# Patient Record
Sex: FEMALE | Race: BLACK OR AFRICAN AMERICAN | NOT HISPANIC OR LATINO | Employment: FULL TIME | URBAN - METROPOLITAN AREA
[De-identification: names, ages, dates, MRNs, and addresses within clinical notes are randomized per-mention and may not be internally consistent; named-entity substitution may affect disease eponyms.]

---

## 2023-10-15 RX ORDER — METHYLPREDNISOLONE 4 MG/1
TABLET ORAL
COMMUNITY

## 2023-10-15 RX ORDER — CYCLOBENZAPRINE HCL 10 MG
10 TABLET ORAL 3 TIMES DAILY PRN
COMMUNITY
Start: 2023-02-24

## 2023-10-17 ENCOUNTER — EVALUATION (OUTPATIENT)
Dept: PHYSICAL THERAPY | Facility: CLINIC | Age: 23
End: 2023-10-17
Payer: MEDICAID

## 2023-10-17 DIAGNOSIS — M54.9 CHRONIC BACK PAIN: Primary | ICD-10-CM

## 2023-10-17 DIAGNOSIS — G89.29 CHRONIC BACK PAIN: Primary | ICD-10-CM

## 2023-10-17 NOTE — PROGRESS NOTES
Physical Therapy Evaluation and Treatment      Patient Name: Suleman Gómez  MRN: [id number]  Today's Date: 10/17/2023       Visit Number: 1  Approved Visits: 30 then auth  Auth required: no  Assessment:  Rehab Prognosis: Good  Evaluation/Treatment Tolerance: Patient tolerated treatment well  Patient presents with chief complaint of back pain that has been present since    .Patient notes that    tends to aggravate his/her sxs. Patient demonstrates decreased lumbar ROM, decreased lumbopelvic control, impaired posture, decreased hip strength, decreased functional strength, and increased back pain, which limits his/her functional ability. Patient would likely benefit from skilled outpatient PT in order to address the above deficits and return to PLOF.   Plan:  Treatment/Interventions: Cryotherapy, Education/ Instruction, Electrical stimulation, Hot pack, Manual therapy, Neuromuscular re-education, Self care/ home management, Taping techniques, Therapeutic activities, Therapeutic exercises  PT Plan: Skilled PT  PT Frequency: 1 time per week  Duration: 6-8 weeks  Onset Date: 23  Number of Treatments Authorized: 30 then auth  Rehab Potential: Good  Plan of Care Agreement: Patient    Current Problem:   1. Chronic back pain            Subjective    General:  General  Reason for Referral: back pain  Referred By: EVELIA Montano  Chief complaint: chronic low back pain. Imaging reveals mild degenerative changes L5-S1, spina bifida S1, large laminar window L5-S1  PMHx:  : verified with patient  Social/Environmental Factors:  PLOF: independent without restrictions  Medications: see chart for full medication list  Patient goals for PT:  Medical Screening: Patient denies bowel/bladder changes, pulsatile mass in abdomen, recent infection/illness, drastic weight change, hx cancer, saddle anesthesia,    Precautions:  Precautions  Precautions Comment: none  Vital Signs:     Pain:     Home Living:     Prior Level of  Function:       Objective   Functional Performance:   DL Squat:   SL Squat:   SLS:   5x sit to stand test:   Step up/down 6in step:   Stairs negotiation: ascending: descending:  Gait Analysis:  Lumbar ROM:    Flexion   Extension:   Rotation:   SB:  Hip ROM:   Flexion: R: L:   Extension: R: L:   IR(0*): R: L:   ER(0*): R: L:   IR(90*): R: L:   ER(90*): R: L:  Neuro Exam:   Dermatome exam:   Myotome exam:   Reflexes:  MMT:   Hip abduction: R:/5 L:/5   Prone hip extension: R:/5 L:/5   Knee extension: R:/5 L:/5   Knee flexion: R:/5 L:/5  Special Tests:   Slump Test:   SLR:    L:    R:   Femoral Nerve Test:   Prone Instability:   Passive extension:  Observation:  Palpation:  Joint Play Assessment:  Repeated Movements:  STEPHANIE:     Outcome Measures:  {PT Outcome Measures:87755}     Treatments:      OP EDUCATION:  Education  Individual(s) Educated: Patient  Education Provided: Home Exercise Program  Risk and Benefits Discussed with Patient/Caregiver/Other: yes  Patient/Caregiver Demonstrated Understanding: yes  Plan of Care Discussed and Agreed Upon: yes  Patient Response to Education: Patient/Caregiver Verbalized Understanding of Information  Education Comment: review HEP    Goals:  Patient will demonstrate improvement in STEPHANIE by at least 12.8 points in order to meet MCID  Patient will demonstrate full lumbar AROM without pain or compensation  Patient will demonstrate negative SLR/Slump test  Patient will demonstrate full hip ROM without pain   Patient will demonstrate at least 4+/5 glute med/max strength without pain  Patient will be able to ambulate community distances without pain or compensation  Patient will demonstrate I with HEP  Patient will be able to return to   without pain or compensation  Patient will be able to perform 10 DL squats without pain or compensation  Patient will demonstrate good lumbopelvic control with dynamic movements/exercise

## 2023-10-24 ENCOUNTER — APPOINTMENT (OUTPATIENT)
Dept: PHYSICAL THERAPY | Facility: CLINIC | Age: 23
End: 2023-10-24
Payer: MEDICAID

## 2024-01-31 ENCOUNTER — APPOINTMENT (OUTPATIENT)
Dept: ORTHOPEDIC SURGERY | Facility: CLINIC | Age: 24
End: 2024-01-31
Payer: MEDICAID

## 2024-12-26 ENCOUNTER — APPOINTMENT (OUTPATIENT)
Dept: ORTHOPEDIC SURGERY | Facility: CLINIC | Age: 24
End: 2024-12-26
Payer: MEDICAID

## 2025-01-03 ENCOUNTER — HOSPITAL ENCOUNTER (EMERGENCY)
Facility: HOSPITAL | Age: 25
Discharge: HOME | End: 2025-01-03
Payer: MEDICAID

## 2025-01-03 VITALS
HEIGHT: 65 IN | SYSTOLIC BLOOD PRESSURE: 144 MMHG | OXYGEN SATURATION: 98 % | DIASTOLIC BLOOD PRESSURE: 94 MMHG | HEART RATE: 98 BPM | WEIGHT: 255 LBS | RESPIRATION RATE: 18 BRPM | TEMPERATURE: 97.3 F | BODY MASS INDEX: 42.49 KG/M2

## 2025-01-03 DIAGNOSIS — M54.41 ACUTE RIGHT-SIDED LOW BACK PAIN WITH RIGHT-SIDED SCIATICA: Primary | ICD-10-CM

## 2025-01-03 PROCEDURE — 99284 EMERGENCY DEPT VISIT MOD MDM: CPT

## 2025-01-03 PROCEDURE — 2500000004 HC RX 250 GENERAL PHARMACY W/ HCPCS (ALT 636 FOR OP/ED)

## 2025-01-03 PROCEDURE — 96372 THER/PROPH/DIAG INJ SC/IM: CPT

## 2025-01-03 RX ORDER — PREDNISONE 20 MG/1
TABLET ORAL
Qty: 18 TABLET | Refills: 0 | Status: SHIPPED | OUTPATIENT
Start: 2025-01-03

## 2025-01-03 RX ORDER — KETOROLAC TROMETHAMINE 30 MG/ML
60 INJECTION, SOLUTION INTRAMUSCULAR; INTRAVENOUS ONCE
Status: COMPLETED | OUTPATIENT
Start: 2025-01-03 | End: 2025-01-03

## 2025-01-03 RX ORDER — GABAPENTIN 300 MG/1
300 CAPSULE ORAL 2 TIMES DAILY
Qty: 14 CAPSULE | Refills: 0 | Status: SHIPPED | OUTPATIENT
Start: 2025-01-03 | End: 2025-01-10

## 2025-01-03 RX ADMIN — KETOROLAC TROMETHAMINE 60 MG: 30 INJECTION, SOLUTION INTRAMUSCULAR at 21:29

## 2025-01-03 ASSESSMENT — COLUMBIA-SUICIDE SEVERITY RATING SCALE - C-SSRS
6. HAVE YOU EVER DONE ANYTHING, STARTED TO DO ANYTHING, OR PREPARED TO DO ANYTHING TO END YOUR LIFE?: NO
2. HAVE YOU ACTUALLY HAD ANY THOUGHTS OF KILLING YOURSELF?: NO
1. IN THE PAST MONTH, HAVE YOU WISHED YOU WERE DEAD OR WISHED YOU COULD GO TO SLEEP AND NOT WAKE UP?: NO

## 2025-01-03 ASSESSMENT — PAIN DESCRIPTION - LOCATION: LOCATION: BACK

## 2025-01-03 ASSESSMENT — PAIN DESCRIPTION - DESCRIPTORS: DESCRIPTORS: SHARP

## 2025-01-03 ASSESSMENT — LIFESTYLE VARIABLES
TOTAL SCORE: 0
HAVE PEOPLE ANNOYED YOU BY CRITICIZING YOUR DRINKING: NO
EVER HAD A DRINK FIRST THING IN THE MORNING TO STEADY YOUR NERVES TO GET RID OF A HANGOVER: NO
EVER FELT BAD OR GUILTY ABOUT YOUR DRINKING: NO
HAVE YOU EVER FELT YOU SHOULD CUT DOWN ON YOUR DRINKING: NO

## 2025-01-03 ASSESSMENT — PAIN DESCRIPTION - FREQUENCY: FREQUENCY: CONSTANT/CONTINUOUS

## 2025-01-03 ASSESSMENT — PAIN SCALES - GENERAL
PAINLEVEL_OUTOF10: 4
PAINLEVEL_OUTOF10: 10 - WORST POSSIBLE PAIN
PAINLEVEL_OUTOF10: 10 - WORST POSSIBLE PAIN

## 2025-01-03 ASSESSMENT — PAIN DESCRIPTION - ORIENTATION: ORIENTATION: RIGHT

## 2025-01-03 ASSESSMENT — PAIN DESCRIPTION - DIRECTION: RADIATING_TOWARDS: DOWN RIGHT LEG

## 2025-01-03 ASSESSMENT — PAIN - FUNCTIONAL ASSESSMENT: PAIN_FUNCTIONAL_ASSESSMENT: 0-10

## 2025-01-03 ASSESSMENT — PAIN DESCRIPTION - PAIN TYPE: TYPE: ACUTE PAIN

## 2025-01-04 NOTE — ED PROVIDER NOTES
HPI   Chief Complaint   Patient presents with    Back Pain     I am having low back pain on the right side goes down my butt cheek thru to my leg on the right side        History provided by: Patient    Limitations to history: None    CC: Back pain    HPI: 24-year-old female with a history of low back pain presents the emergency department to be evaluated for acute on chronic low back pain.  Patient has had intermittent problems with her lower back for several years.  She has been in the emergency department for this before and given a referral for orthopedics.  She did follow-up with orthopedics who performed an x-ray of the lumbar spine, at that time there were no acute abnormalities and she was given a referral for physical therapy however patient was unable to follow-up given other obligations.  Patient states that this exacerbation of her low back pain has been present for the last few days.  She localized the pain to her right buttock and states the pain will radiate down her right leg.  Denies any numbness tingling or weakness in the extremity.  Denies saddle anesthesia, urinary tension, stool incontinence.  Denies history of recent spinal injection, history of cancer, history of IV drug use.  Denies fever and chills.  She is able to ambulate even given her discomfort.  The pain is worse with palpation and movement.  Denies any extremity redness, warmth, swelling, bruising.  Denies history of DVTs and denies any risk factors including recent plane flights, recent surgeries, use of estrogen.  Denies history of arterial disease.  Denies chest pain and shortness of breath.  Denies upper back pain or neck pain.  Denies any flulike symptoms.  She has been taking ibuprofen intermittently for her discomfort.  Denies all other systemic symptoms.    ROS: Negative unless mentioned in HPI    Medical Hx: Denies allergies.  Immunizations up-to-date.    Physical exam:    Constitutional: Patient is well-nourished and  well-developed.   Appears to be uncomfortable but nontoxic in appearance.Oriented to person, place, time, and situation.    HEENT: Head is normocephalic, atraumatic. Patient's airway is patent.  Tympanic membranes are clear bilaterally.  Nasal mucosa clear.  Mouth with normal mucosa.  Throat is not erythematous and there are no oropharyngeal exudates, uvula is midline.  No obvious facial deformities.    Eyes: Clear bilaterally.  Pupils are equal round and reactive to light and accommodation.  Extraocular movements intact.      Cardiac: Tachycardia, regular rhythm.  Heart sounds S1, S2.  No murmurs, rubs, or gallops.  PMI nondisplaced.  No JVD.    Respiratory: Regular respiratory rate and effort.  Breath sounds are clear and equal bilaterally, no adventitious lung sounds.  Patient is speaking in full sentences and is in no apparent respiratory distress. No use of accessory muscles.      Gastrointestinal: Abdomen is soft, nondistended, and nontender.  There are no obvious deformities.  No rebound tenderness or guarding.  Bowel sounds are normal active.    Genitourinary: No CVA or flank tenderness.    Musculoskeletal:    No muscular or midline tenderness in the cervical, thoracic, lumbar region.  Patient does have pain in the right SI joint and pain exacerbation with a right leg straight raise. No obvious skin or bony deformities.  Patient has equal range of motion in all extremities and no strength deficiencies.   Capillary refill less than 3 seconds.  Strong peripheral pulses.  No sensory deficits.    Neurological: Patient is alert and oriented.  No focal deficits.  5/5 strength in all extremities.  Cranial nerves II through XII intact. GCS15.     Skin: Skin is normal color for race and is warm, dry, and intact.  No evidence of trauma.  No lesions, rashes, bruising, jaundice, or masses.    Psych: Appropriate mood and affect.  No apparent risk to self or others.    Heme/lymph: No adenopathy, lymphadenopathy, or  splenomegaly    Physical exam is otherwise negative unless stated above or in history of present illness.               Patient History   No past medical history on file.  No past surgical history on file.  No family history on file.  Social History     Tobacco Use    Smoking status: Not on file    Smokeless tobacco: Not on file   Substance Use Topics    Alcohol use: Not on file    Drug use: Not on file       Physical Exam   ED Triage Vitals [01/03/25 2052]   Temperature Heart Rate Respirations BP   35.6 °C (96.1 °F) (!) 114 18 (!) 164/98      Pulse Ox Temp Source Heart Rate Source Patient Position   98 % Temporal Monitor Sitting      BP Location FiO2 (%)     Right arm --       Physical Exam      ED Course & MDM   Diagnoses as of 01/03/25 2200   Acute right-sided low back pain with right-sided sciatica        Patient updated on plan for lab testing, IV insertion, radiology imaging, and medications to be administered while in the ER (if indicated). Patient updated on expected wait times for testing and results. Patient provided my name and told to ask any staff member for questions or concerns if they should arise. Electronic medical record reviewed.     [facility]    Upon initial assessment, patient  appears to be uncomfortable but nontoxic in appearance.    Patient presented to the emergency department with the chief complaint back pain.   No muscular or midline tenderness in the cervical, thoracic, lumbar region.  Patient does have pain in the right SI joint and pain exacerbation with a right leg straight raise. No obvious skin or bony deformities.  Patient has equal range of motion in all extremities and no strength deficiencies.   Capillary refill less than 3 seconds.  Strong peripheral pulses.  No sensory deficits.  No muffled heart sounds, JVD, or murmur.  Examination of her heart and lungs is unremarkable than mild tachycardia likely from her acute discomfort.  On arrival to the emergency department, vital signs  were significant for tachycardia and hypertension likely due to her acute discomfort.     At this time patient has no history or physical exam findings that would suggest acute cord compression syndrome or cauda equina.  Low suspicion for spinal abscess.  Patient does not require emergent MRI at this time.  low suspicion for ACS, PE, AAA.  Low suspicion for vertebral injury given that the patient has no midline tenderness.    I do believe the patient is likely experiencing sciatica or lumbar radiculopathy.  Will give the patient IM Toradol for her discomfort, she denies any chance of pregnancy.    Patient feels much better after medications and her heart rate and blood pressure have improved.  She feels well and she feels comfortable going home.  Patient will be discharged with a prednisone taper and low-dose gabapentin for 7 days.  I recommended OTC ibuprofen and Tylenol as well.  I discussed supportive treatment including low weightbearing exercise and stretching techniques.  I discouraged immobilization.  I will have her follow-up with orthopedics as an outpatient as I do believe she benefit from physical therapy.  All questions and concerns addressed.  Reasons to return to ER discussed.  Patient verbalized understanding and agreement with the treatment plan and they remained hemodynamically stable in the ER.    This note was dictated using a speech recognition program.  While an attempt was made at proof-reading to minimize errors, minor errors in transcription may be present         No data recorded     Alessio Coma Scale Score: 15 (01/03/25 2057 : Luda Fortune RN)                           Medical Decision Making      Procedure  Procedures     Rich Anderson PA-C  01/03/25 9496

## 2025-02-05 ENCOUNTER — APPOINTMENT (OUTPATIENT)
Dept: ORTHOPEDIC SURGERY | Facility: CLINIC | Age: 25
End: 2025-02-05
Payer: MEDICAID